# Patient Record
Sex: MALE | Race: WHITE | NOT HISPANIC OR LATINO | URBAN - METROPOLITAN AREA
[De-identification: names, ages, dates, MRNs, and addresses within clinical notes are randomized per-mention and may not be internally consistent; named-entity substitution may affect disease eponyms.]

---

## 2021-10-01 ENCOUNTER — INPATIENT HOSPITAL (OUTPATIENT)
Dept: URBAN - METROPOLITAN AREA HOSPITAL 124 | Facility: HOSPITAL | Age: 71
End: 2021-10-01

## 2021-10-01 DIAGNOSIS — D64.9 ANEMIA, UNSPECIFIED: ICD-10-CM

## 2021-10-01 DIAGNOSIS — R19.5 OTHER FECAL ABNORMALITIES: ICD-10-CM

## 2021-10-01 DIAGNOSIS — K92.1 MELENA: ICD-10-CM

## 2021-10-01 DIAGNOSIS — I48.91 UNSPECIFIED ATRIAL FIBRILLATION: ICD-10-CM

## 2021-10-01 PROCEDURE — 43235 EGD DIAGNOSTIC BRUSH WASH: CPT | Performed by: INTERNAL MEDICINE

## 2021-10-01 PROCEDURE — 99222 1ST HOSP IP/OBS MODERATE 55: CPT | Mod: 25 | Performed by: NURSE PRACTITIONER

## 2021-10-02 ENCOUNTER — INPATIENT HOSPITAL (OUTPATIENT)
Dept: URBAN - METROPOLITAN AREA HOSPITAL 124 | Facility: HOSPITAL | Age: 71
End: 2021-10-02

## 2021-10-02 DIAGNOSIS — R19.5 OTHER FECAL ABNORMALITIES: ICD-10-CM

## 2021-10-02 DIAGNOSIS — D64.9 ANEMIA, UNSPECIFIED: ICD-10-CM

## 2021-10-02 DIAGNOSIS — I48.91 UNSPECIFIED ATRIAL FIBRILLATION: ICD-10-CM

## 2021-10-02 PROCEDURE — 99232 SBSQ HOSP IP/OBS MODERATE 35: CPT | Performed by: INTERNAL MEDICINE

## 2021-10-04 ENCOUNTER — INPATIENT HOSPITAL (OUTPATIENT)
Dept: URBAN - METROPOLITAN AREA HOSPITAL 124 | Facility: HOSPITAL | Age: 71
End: 2021-10-04

## 2021-10-04 DIAGNOSIS — K92.1 MELENA: ICD-10-CM

## 2021-10-04 DIAGNOSIS — D12.3 BENIGN NEOPLASM OF TRANSVERSE COLON: ICD-10-CM

## 2021-10-04 PROCEDURE — 45385 COLONOSCOPY W/LESION REMOVAL: CPT | Performed by: INTERNAL MEDICINE

## 2022-04-15 ENCOUNTER — OFFICE (OUTPATIENT)
Dept: URBAN - METROPOLITAN AREA CLINIC 12 | Facility: CLINIC | Age: 72
End: 2022-04-15

## 2022-04-15 VITALS
WEIGHT: 229 LBS | DIASTOLIC BLOOD PRESSURE: 79 MMHG | SYSTOLIC BLOOD PRESSURE: 168 MMHG | HEART RATE: 113 BPM | OXYGEN SATURATION: 94 % | HEIGHT: 73 IN

## 2022-04-15 DIAGNOSIS — R19.5 OTHER FECAL ABNORMALITIES: ICD-10-CM

## 2022-04-15 DIAGNOSIS — D50.9 IRON DEFICIENCY ANEMIA, UNSPECIFIED: ICD-10-CM

## 2022-04-15 LAB
CBC, PLATELET, NO DIFFERENTIAL: HEMATOCRIT: 30.8 % — LOW (ref 37.5–51)
CBC, PLATELET, NO DIFFERENTIAL: HEMOGLOBIN: 8.9 G/DL — LOW (ref 13–17.7)
CBC, PLATELET, NO DIFFERENTIAL: MCH: 25.3 PG — LOW (ref 26.6–33)
CBC, PLATELET, NO DIFFERENTIAL: MCHC: 28.9 G/DL — LOW (ref 31.5–35.7)
CBC, PLATELET, NO DIFFERENTIAL: MCV: 88 FL (ref 79–97)
CBC, PLATELET, NO DIFFERENTIAL: PLATELETS: 359 X10E3/UL (ref 150–450)
CBC, PLATELET, NO DIFFERENTIAL: RBC: 3.52 X10E6/UL — LOW (ref 4.14–5.8)
CBC, PLATELET, NO DIFFERENTIAL: RDW: 21.7 % — HIGH (ref 11.6–15.4)
CBC, PLATELET, NO DIFFERENTIAL: WBC: 12.1 X10E3/UL — HIGH (ref 3.4–10.8)
COMP. METABOLIC PANEL (14): A/G RATIO: 1.5 (ref 1.2–2.2)
COMP. METABOLIC PANEL (14): ALBUMIN: 4.3 G/DL (ref 3.7–4.7)
COMP. METABOLIC PANEL (14): ALKALINE PHOSPHATASE: 82 IU/L (ref 44–121)
COMP. METABOLIC PANEL (14): ALT (SGPT): 18 IU/L (ref 0–44)
COMP. METABOLIC PANEL (14): AST (SGOT): 21 IU/L (ref 0–40)
COMP. METABOLIC PANEL (14): BILIRUBIN, TOTAL: 0.2 MG/DL (ref 0–1.2)
COMP. METABOLIC PANEL (14): BUN/CREATININE RATIO: 20 (ref 10–24)
COMP. METABOLIC PANEL (14): BUN: 27 MG/DL (ref 8–27)
COMP. METABOLIC PANEL (14): CALCIUM: 11.1 MG/DL — HIGH (ref 8.6–10.2)
COMP. METABOLIC PANEL (14): CARBON DIOXIDE, TOTAL: 21 MMOL/L (ref 20–29)
COMP. METABOLIC PANEL (14): CHLORIDE: 105 MMOL/L (ref 96–106)
COMP. METABOLIC PANEL (14): CREATININE: 1.32 MG/DL — HIGH (ref 0.76–1.27)
COMP. METABOLIC PANEL (14): EGFR: 58 ML/MIN/1.73 — LOW (ref 59–?)
COMP. METABOLIC PANEL (14): GLOBULIN, TOTAL: 2.8 G/DL (ref 1.5–4.5)
COMP. METABOLIC PANEL (14): GLUCOSE: 83 MG/DL (ref 65–99)
COMP. METABOLIC PANEL (14): POTASSIUM: 5.2 MMOL/L (ref 3.5–5.2)
COMP. METABOLIC PANEL (14): PROTEIN, TOTAL: 7.1 G/DL (ref 6–8.5)
COMP. METABOLIC PANEL (14): SODIUM: 139 MMOL/L (ref 134–144)
FERRITIN: 32 NG/ML (ref 30–400)
IRON AND TIBC: IRON BIND.CAP.(TIBC): 405 UG/DL (ref 250–450)
IRON AND TIBC: IRON SATURATION: 6 % — CRITICAL LOW (ref 15–55)
IRON AND TIBC: IRON: 24 UG/DL — LOW (ref 38–169)
IRON AND TIBC: UIBC: 381 UG/DL — HIGH (ref 111–343)

## 2022-04-15 PROCEDURE — 99214 OFFICE O/P EST MOD 30 MIN: CPT | Performed by: INTERNAL MEDICINE

## 2022-04-21 ENCOUNTER — OFFICE (OUTPATIENT)
Dept: URBAN - METROPOLITAN AREA CLINIC 11 | Facility: CLINIC | Age: 72
End: 2022-04-21
Payer: MEDICARE

## 2022-04-21 VITALS
DIASTOLIC BLOOD PRESSURE: 90 MMHG | SYSTOLIC BLOOD PRESSURE: 154 MMHG | HEART RATE: 44 BPM | DIASTOLIC BLOOD PRESSURE: 86 MMHG | HEIGHT: 73 IN | SYSTOLIC BLOOD PRESSURE: 174 MMHG | DIASTOLIC BLOOD PRESSURE: 69 MMHG | TEMPERATURE: 97.8 F | SYSTOLIC BLOOD PRESSURE: 142 MMHG | TEMPERATURE: 98 F | HEART RATE: 69 BPM | RESPIRATION RATE: 18 BRPM | HEART RATE: 63 BPM

## 2022-04-21 DIAGNOSIS — T45.4X5A ADVERSE EFFECT OF IRON AND ITS COMPOUNDS, INITIAL ENCOUNTER: ICD-10-CM

## 2022-04-21 DIAGNOSIS — D50.9 IRON DEFICIENCY ANEMIA, UNSPECIFIED: ICD-10-CM

## 2022-04-21 PROCEDURE — 96365 THER/PROPH/DIAG IV INF INIT: CPT | Performed by: INTERNAL MEDICINE

## 2022-04-21 NOTE — SERVICENOTES
Next Venofer infusion is 4/28 @ 0930.
Patient observed for 15 minutes post infusion. 
Patient denies chest pain, shortness of breath or dizziness.  
Venofer Handout given and reviewed with patient.
Patient was instructed on common side effects.
Patient verbalized a complete understanding and has no questions.

## 2022-04-26 ENCOUNTER — OFFICE (OUTPATIENT)
Dept: URBAN - METROPOLITAN AREA CLINIC 11 | Facility: CLINIC | Age: 72
End: 2022-04-26
Payer: MEDICARE

## 2022-04-26 VITALS
SYSTOLIC BLOOD PRESSURE: 185 MMHG | HEIGHT: 73 IN | TEMPERATURE: 97.8 F | SYSTOLIC BLOOD PRESSURE: 183 MMHG | DIASTOLIC BLOOD PRESSURE: 96 MMHG | RESPIRATION RATE: 18 BRPM | HEART RATE: 62 BPM | HEART RATE: 68 BPM | SYSTOLIC BLOOD PRESSURE: 164 MMHG | DIASTOLIC BLOOD PRESSURE: 88 MMHG

## 2022-04-26 DIAGNOSIS — D50.9 IRON DEFICIENCY ANEMIA, UNSPECIFIED: ICD-10-CM

## 2022-04-26 DIAGNOSIS — T45.4X5A ADVERSE EFFECT OF IRON AND ITS COMPOUNDS, INITIAL ENCOUNTER: ICD-10-CM

## 2022-04-26 PROCEDURE — 96365 THER/PROPH/DIAG IV INF INIT: CPT | Performed by: INTERNAL MEDICINE

## 2022-04-29 ENCOUNTER — OFFICE (OUTPATIENT)
Dept: URBAN - METROPOLITAN AREA CLINIC 11 | Facility: CLINIC | Age: 72
End: 2022-04-29
Payer: MEDICARE

## 2022-04-29 VITALS
DIASTOLIC BLOOD PRESSURE: 63 MMHG | HEIGHT: 73 IN | HEART RATE: 63 BPM | TEMPERATURE: 98 F | TEMPERATURE: 97.9 F | SYSTOLIC BLOOD PRESSURE: 120 MMHG | DIASTOLIC BLOOD PRESSURE: 60 MMHG | HEART RATE: 46 BPM | SYSTOLIC BLOOD PRESSURE: 122 MMHG | SYSTOLIC BLOOD PRESSURE: 143 MMHG | DIASTOLIC BLOOD PRESSURE: 73 MMHG | RESPIRATION RATE: 18 BRPM

## 2022-04-29 DIAGNOSIS — D50.9 IRON DEFICIENCY ANEMIA, UNSPECIFIED: ICD-10-CM

## 2022-04-29 DIAGNOSIS — T45.4X5A ADVERSE EFFECT OF IRON AND ITS COMPOUNDS, INITIAL ENCOUNTER: ICD-10-CM

## 2022-04-29 PROCEDURE — 96365 THER/PROPH/DIAG IV INF INIT: CPT | Performed by: INTERNAL MEDICINE

## 2022-05-02 ENCOUNTER — OFFICE (OUTPATIENT)
Dept: URBAN - METROPOLITAN AREA CLINIC 11 | Facility: CLINIC | Age: 72
End: 2022-05-02
Payer: MEDICARE

## 2022-05-02 VITALS
HEART RATE: 57 BPM | DIASTOLIC BLOOD PRESSURE: 75 MMHG | HEART RATE: 78 BPM | DIASTOLIC BLOOD PRESSURE: 79 MMHG | HEART RATE: 70 BPM | DIASTOLIC BLOOD PRESSURE: 70 MMHG | TEMPERATURE: 97.8 F | SYSTOLIC BLOOD PRESSURE: 142 MMHG | SYSTOLIC BLOOD PRESSURE: 134 MMHG | RESPIRATION RATE: 18 BRPM | SYSTOLIC BLOOD PRESSURE: 144 MMHG | HEIGHT: 73 IN

## 2022-05-02 DIAGNOSIS — T45.4X5A ADVERSE EFFECT OF IRON AND ITS COMPOUNDS, INITIAL ENCOUNTER: ICD-10-CM

## 2022-05-02 DIAGNOSIS — D50.9 IRON DEFICIENCY ANEMIA, UNSPECIFIED: ICD-10-CM

## 2022-05-02 PROCEDURE — 96365 THER/PROPH/DIAG IV INF INIT: CPT | Performed by: INTERNAL MEDICINE

## 2022-05-09 ENCOUNTER — OFFICE (OUTPATIENT)
Dept: URBAN - METROPOLITAN AREA CLINIC 11 | Facility: CLINIC | Age: 72
End: 2022-05-09
Payer: MEDICARE

## 2022-05-09 ENCOUNTER — OFFICE (OUTPATIENT)
Dept: URBAN - METROPOLITAN AREA CLINIC 11 | Facility: CLINIC | Age: 72
End: 2022-05-09

## 2022-05-09 VITALS
HEART RATE: 55 BPM | HEART RATE: 56 BPM | DIASTOLIC BLOOD PRESSURE: 59 MMHG | SYSTOLIC BLOOD PRESSURE: 104 MMHG | DIASTOLIC BLOOD PRESSURE: 58 MMHG | TEMPERATURE: 97.7 F | SYSTOLIC BLOOD PRESSURE: 108 MMHG | HEIGHT: 73 IN | RESPIRATION RATE: 18 BRPM | HEART RATE: 54 BPM | TEMPERATURE: 98.2 F | DIASTOLIC BLOOD PRESSURE: 53 MMHG | SYSTOLIC BLOOD PRESSURE: 131 MMHG

## 2022-05-09 DIAGNOSIS — D50.9 IRON DEFICIENCY ANEMIA, UNSPECIFIED: ICD-10-CM

## 2022-05-09 DIAGNOSIS — T45.4X5A ADVERSE EFFECT OF IRON AND ITS COMPOUNDS, INITIAL ENCOUNTER: ICD-10-CM

## 2022-05-09 PROCEDURE — 96365 THER/PROPH/DIAG IV INF INIT: CPT | Performed by: INTERNAL MEDICINE

## 2022-05-09 PROCEDURE — 91110 GI TRC IMG INTRAL ESOPH-ILE: CPT | Performed by: INTERNAL MEDICINE

## 2022-09-02 ENCOUNTER — OFFICE (OUTPATIENT)
Dept: URBAN - METROPOLITAN AREA CLINIC 12 | Facility: CLINIC | Age: 72
End: 2022-09-02

## 2022-09-02 VITALS
SYSTOLIC BLOOD PRESSURE: 134 MMHG | HEIGHT: 73 IN | WEIGHT: 224 LBS | DIASTOLIC BLOOD PRESSURE: 73 MMHG | OXYGEN SATURATION: 94 % | HEART RATE: 88 BPM

## 2022-09-02 DIAGNOSIS — D50.9 IRON DEFICIENCY ANEMIA, UNSPECIFIED: ICD-10-CM

## 2022-09-02 DIAGNOSIS — K55.20 ANGIODYSPLASIA OF COLON WITHOUT HEMORRHAGE: ICD-10-CM

## 2022-09-02 LAB
BASIC METABOLIC PANEL (8): BUN/CREATININE RATIO: 14 (ref 10–24)
BASIC METABOLIC PANEL (8): BUN: 19 MG/DL (ref 8–27)
BASIC METABOLIC PANEL (8): CALCIUM: 11.6 MG/DL — HIGH (ref 8.6–10.2)
BASIC METABOLIC PANEL (8): CARBON DIOXIDE, TOTAL: 24 MMOL/L (ref 20–29)
BASIC METABOLIC PANEL (8): CHLORIDE: 103 MMOL/L (ref 96–106)
BASIC METABOLIC PANEL (8): CREATININE: 1.35 MG/DL — HIGH (ref 0.76–1.27)
BASIC METABOLIC PANEL (8): EGFR: 56 ML/MIN/1.73 — LOW (ref 59–?)
BASIC METABOLIC PANEL (8): GLUCOSE: 101 MG/DL — HIGH (ref 65–99)
BASIC METABOLIC PANEL (8): POTASSIUM: 5.7 MMOL/L — HIGH (ref 3.5–5.2)
BASIC METABOLIC PANEL (8): SODIUM: 141 MMOL/L (ref 134–144)
CBC, PLATELET, NO DIFFERENTIAL: HEMATOCRIT: 37.4 % — LOW (ref 37.5–51)
CBC, PLATELET, NO DIFFERENTIAL: HEMOGLOBIN: 11.7 G/DL — LOW (ref 13–17.7)
CBC, PLATELET, NO DIFFERENTIAL: MCH: 29.7 PG (ref 26.6–33)
CBC, PLATELET, NO DIFFERENTIAL: MCHC: 31.3 G/DL — LOW (ref 31.5–35.7)
CBC, PLATELET, NO DIFFERENTIAL: MCV: 95 FL (ref 79–97)
CBC, PLATELET, NO DIFFERENTIAL: PLATELETS: 260 X10E3/UL (ref 150–450)
CBC, PLATELET, NO DIFFERENTIAL: RBC: 3.94 X10E6/UL — LOW (ref 4.14–5.8)
CBC, PLATELET, NO DIFFERENTIAL: RDW: 12.1 % (ref 11.6–15.4)
CBC, PLATELET, NO DIFFERENTIAL: WBC: 9.6 X10E3/UL (ref 3.4–10.8)
FE+TIBC+FER: FERRITIN: 21 NG/ML — LOW (ref 30–400)
FE+TIBC+FER: IRON BIND.CAP.(TIBC): 386 UG/DL (ref 250–450)
FE+TIBC+FER: IRON SATURATION: 12 % — LOW (ref 15–55)
FE+TIBC+FER: IRON: 47 UG/DL (ref 38–169)
FE+TIBC+FER: UIBC: 339 UG/DL (ref 111–343)

## 2022-09-02 PROCEDURE — 99214 OFFICE O/P EST MOD 30 MIN: CPT | Performed by: INTERNAL MEDICINE

## 2022-11-18 ENCOUNTER — ON CAMPUS - OUTPATIENT (OUTPATIENT)
Dept: URBAN - METROPOLITAN AREA HOSPITAL 125 | Facility: HOSPITAL | Age: 72
End: 2022-11-18
Payer: MEDICARE

## 2022-11-18 DIAGNOSIS — K92.2 GASTROINTESTINAL HEMORRHAGE, UNSPECIFIED: ICD-10-CM

## 2022-11-18 DIAGNOSIS — K44.9 DIAPHRAGMATIC HERNIA WITHOUT OBSTRUCTION OR GANGRENE: ICD-10-CM

## 2022-11-18 PROCEDURE — 44361 SMALL BOWEL ENDOSCOPY/BIOPSY: CPT | Performed by: INTERNAL MEDICINE

## 2023-04-07 ENCOUNTER — OFFICE (OUTPATIENT)
Dept: URBAN - METROPOLITAN AREA CLINIC 12 | Facility: CLINIC | Age: 73
End: 2023-04-07

## 2023-04-07 VITALS
OXYGEN SATURATION: 94 % | WEIGHT: 228 LBS | HEART RATE: 76 BPM | SYSTOLIC BLOOD PRESSURE: 123 MMHG | DIASTOLIC BLOOD PRESSURE: 62 MMHG | HEIGHT: 73 IN

## 2023-04-07 DIAGNOSIS — R11.0 NAUSEA: ICD-10-CM

## 2023-04-07 DIAGNOSIS — K55.20 ANGIODYSPLASIA OF COLON WITHOUT HEMORRHAGE: ICD-10-CM

## 2023-04-07 DIAGNOSIS — K21.9 GASTRO-ESOPHAGEAL REFLUX DISEASE WITHOUT ESOPHAGITIS: ICD-10-CM

## 2023-04-07 DIAGNOSIS — R14.2 ERUCTATION: ICD-10-CM

## 2023-04-07 DIAGNOSIS — D50.9 IRON DEFICIENCY ANEMIA, UNSPECIFIED: ICD-10-CM

## 2023-04-07 PROCEDURE — 99214 OFFICE O/P EST MOD 30 MIN: CPT | Performed by: INTERNAL MEDICINE

## 2023-04-07 RX ORDER — SUCRALFATE 1 G/1
2 TABLET ORAL
Qty: 60 | Refills: 1 | Status: ACTIVE
Start: 2023-04-07

## 2023-04-08 LAB
CBC, PLATELET, NO DIFFERENTIAL: HEMATOCRIT: 30.8 % — LOW (ref 37.5–51)
CBC, PLATELET, NO DIFFERENTIAL: HEMOGLOBIN: 9.9 G/DL — LOW (ref 13–17.7)
CBC, PLATELET, NO DIFFERENTIAL: MCH: 28.5 PG (ref 26.6–33)
CBC, PLATELET, NO DIFFERENTIAL: MCHC: 32.1 G/DL (ref 31.5–35.7)
CBC, PLATELET, NO DIFFERENTIAL: MCV: 89 FL (ref 79–97)
CBC, PLATELET, NO DIFFERENTIAL: PLATELETS: 283 X10E3/UL (ref 150–450)
CBC, PLATELET, NO DIFFERENTIAL: RBC: 3.47 X10E6/UL — LOW (ref 4.14–5.8)
CBC, PLATELET, NO DIFFERENTIAL: RDW: 12.4 % (ref 11.6–15.4)
CBC, PLATELET, NO DIFFERENTIAL: WBC: 9.6 X10E3/UL (ref 3.4–10.8)
FERRITIN: 10 NG/ML — LOW (ref 30–400)
IRON AND TIBC: IRON BIND.CAP.(TIBC): 399 UG/DL (ref 250–450)
IRON AND TIBC: IRON SATURATION: 6 % — CRITICAL LOW (ref 15–55)
IRON AND TIBC: IRON: 23 UG/DL — LOW (ref 38–169)
IRON AND TIBC: UIBC: 376 UG/DL — HIGH (ref 111–343)

## 2023-08-18 ENCOUNTER — OFFICE (OUTPATIENT)
Dept: URBAN - METROPOLITAN AREA CLINIC 12 | Facility: CLINIC | Age: 73
End: 2023-08-18

## 2023-08-18 VITALS
WEIGHT: 219 LBS | HEART RATE: 80 BPM | OXYGEN SATURATION: 96 % | HEIGHT: 73 IN | SYSTOLIC BLOOD PRESSURE: 108 MMHG | DIASTOLIC BLOOD PRESSURE: 60 MMHG

## 2023-08-18 DIAGNOSIS — R11.0 NAUSEA: ICD-10-CM

## 2023-08-18 DIAGNOSIS — K21.9 GASTRO-ESOPHAGEAL REFLUX DISEASE WITHOUT ESOPHAGITIS: ICD-10-CM

## 2023-08-18 DIAGNOSIS — D50.9 IRON DEFICIENCY ANEMIA, UNSPECIFIED: ICD-10-CM

## 2023-08-18 DIAGNOSIS — R14.2 ERUCTATION: ICD-10-CM

## 2023-08-18 PROCEDURE — 99214 OFFICE O/P EST MOD 30 MIN: CPT | Performed by: INTERNAL MEDICINE

## 2023-08-19 LAB
CBC, PLATELET, NO DIFFERENTIAL: HEMATOCRIT: 30.6 % — LOW (ref 37.5–51)
CBC, PLATELET, NO DIFFERENTIAL: HEMOGLOBIN: 9.5 G/DL — LOW (ref 13–17.7)
CBC, PLATELET, NO DIFFERENTIAL: MCH: 28.4 PG (ref 26.6–33)
CBC, PLATELET, NO DIFFERENTIAL: MCHC: 31 G/DL — LOW (ref 31.5–35.7)
CBC, PLATELET, NO DIFFERENTIAL: MCV: 92 FL (ref 79–97)
CBC, PLATELET, NO DIFFERENTIAL: PLATELETS: 293 X10E3/UL (ref 150–450)
CBC, PLATELET, NO DIFFERENTIAL: RBC: 3.34 X10E6/UL — LOW (ref 4.14–5.8)
CBC, PLATELET, NO DIFFERENTIAL: RDW: 13.2 % (ref 11.6–15.4)
CBC, PLATELET, NO DIFFERENTIAL: WBC: 11.9 X10E3/UL — HIGH (ref 3.4–10.8)
COMP. METABOLIC PANEL (14): A/G RATIO: 1.5 (ref 1.2–2.2)
COMP. METABOLIC PANEL (14): ALBUMIN: 3.9 G/DL (ref 3.8–4.8)
COMP. METABOLIC PANEL (14): ALKALINE PHOSPHATASE: 98 IU/L (ref 44–121)
COMP. METABOLIC PANEL (14): ALT (SGPT): 9 IU/L (ref 0–44)
COMP. METABOLIC PANEL (14): AST (SGOT): 15 IU/L (ref 0–40)
COMP. METABOLIC PANEL (14): BILIRUBIN, TOTAL: <0.2 MG/DL
COMP. METABOLIC PANEL (14): BUN/CREATININE RATIO: 14 (ref 10–24)
COMP. METABOLIC PANEL (14): BUN: 17 MG/DL (ref 8–27)
COMP. METABOLIC PANEL (14): CALCIUM: 10.3 MG/DL — HIGH (ref 8.6–10.2)
COMP. METABOLIC PANEL (14): CARBON DIOXIDE, TOTAL: 24 MMOL/L (ref 20–29)
COMP. METABOLIC PANEL (14): CHLORIDE: 105 MMOL/L (ref 96–106)
COMP. METABOLIC PANEL (14): CREATININE: 1.24 MG/DL (ref 0.76–1.27)
COMP. METABOLIC PANEL (14): EGFR: 62 ML/MIN/1.73 (ref 59–?)
COMP. METABOLIC PANEL (14): GLOBULIN, TOTAL: 2.6 G/DL (ref 1.5–4.5)
COMP. METABOLIC PANEL (14): GLUCOSE: 97 MG/DL (ref 70–99)
COMP. METABOLIC PANEL (14): POTASSIUM: 4.6 MMOL/L (ref 3.5–5.2)
COMP. METABOLIC PANEL (14): PROTEIN, TOTAL: 6.5 G/DL (ref 6–8.5)
COMP. METABOLIC PANEL (14): SODIUM: 139 MMOL/L (ref 134–144)
FE+TIBC+FER: FERRITIN: 12 NG/ML — LOW (ref 30–400)
FE+TIBC+FER: IRON BIND.CAP.(TIBC): 368 UG/DL (ref 250–450)
FE+TIBC+FER: IRON SATURATION: 4 % — CRITICAL LOW (ref 15–55)
FE+TIBC+FER: IRON: 15 UG/DL — LOW (ref 38–169)
FE+TIBC+FER: UIBC: 353 UG/DL — HIGH (ref 111–343)

## 2024-02-13 ENCOUNTER — OFFICE (OUTPATIENT)
Dept: URBAN - METROPOLITAN AREA CLINIC 11 | Facility: CLINIC | Age: 74
End: 2024-02-13

## 2024-02-13 VITALS
HEIGHT: 73 IN | HEART RATE: 79 BPM | DIASTOLIC BLOOD PRESSURE: 78 MMHG | OXYGEN SATURATION: 97 % | SYSTOLIC BLOOD PRESSURE: 104 MMHG | WEIGHT: 210 LBS

## 2024-02-13 DIAGNOSIS — K21.9 GASTRO-ESOPHAGEAL REFLUX DISEASE WITHOUT ESOPHAGITIS: ICD-10-CM

## 2024-02-13 DIAGNOSIS — D50.9 IRON DEFICIENCY ANEMIA, UNSPECIFIED: ICD-10-CM

## 2024-02-13 PROCEDURE — 99214 OFFICE O/P EST MOD 30 MIN: CPT | Performed by: NURSE PRACTITIONER

## 2024-02-13 RX ORDER — PANTOPRAZOLE 40 MG/1
TABLET, DELAYED RELEASE ORAL
Qty: 30 | Refills: 4 | Status: ACTIVE

## 2024-02-13 RX ORDER — SUCRALFATE 1 G/1
2 TABLET ORAL
Qty: 60 | Refills: 1 | Status: ACTIVE
Start: 2023-04-07

## 2024-02-13 NOTE — SERVICENOTES
will check labs and he will continue his oral iron.   He continues to have belching and some reflux.  Will check gastric emptying study as this was not previously done.  He will continue pantoprazole and restart sucralfate.  Will see back in 4 months or sooner if needed

## 2024-02-13 NOTE — SERVICEHPINOTES
Mr. Rowe is a 72yo M that presents for follow-up of his iron deficiency anemia.   He was having issues with peripheral neuropathy at his last visit and a gastric emptying study was ordered.  However, he was diagnosed with AFib and did not have the test done.  In follow-up today,    he is doing fairly well.  He had an ablation by Dr. Alcocer  for his AFib.   He continues taking oral iron daily and his PCP at [facility].   He continues on pantoprazole daily but has some belching, bloating and occasional reflux.   He is taking sucralfate in the  past and this has helped his symptoms.  He denies any nausea, vomiting, abdominal pain br
brAn outline of his history can be seen below.  after his initial visit we did plan for IV iron given that he had not responded very well to oral iron.  We performed a capsule endoscopy which was a difficult study as the capsule seemingly got hung in the duodenal bulb area for a prolonged amount of time before passing back into the stomach and then back into the duodenum.  There was a vascular type lesion in the small bowel but the exact location is difficult to know due to the timing issues.  In follow-up he was doing very well.  We did perform a single balloon enteroscopy in November 2022 but a vascular lesion was not identified either indicating the previously seen lesion had healed or we were unable to reach far enough in the small bowel.  At last visit he was doing well overall but noted that his cardiologist started him on Eliquis due to his atrial fibrillation.  at that time he denied any overt bleeding but we discussed that given his history he needed to monitor for signs of blood loss very closely and he was watching his stools.  We checked his labs and he did have some persistent mild anemia but his iron studies were normal which is reassuring that he was not having significant blood loss as he likely has some component of anemia of chronic disease as well.  In follow-up today he reports that he continues to have issues with peripheral neuropathy.  He also reports that he is having more frequent episodes of atrial fibrillation which he can tell.  He says his cardiologist is aware of this.  When he arrived today he said he felt like he was having 1 of these episodes and on initial check of his vitals his heart rate was reported to be 113. however shortly thereafter when I repeated this he said it felt like his heart rate issues had resolved and his heart rate on manual recheck was 80. he notes that he has continued to have dark stools while taking oral iron.  He stop taking his oral iron for a week earlier this month to ensure that his stools returned to normal and he said that his stools did returned to brown and were no longer dark.  He has subsequently started his iron back. he does report continued belching and bloating  I initially saw him in clinic in April 2022 when he presented to establish care in clinic as a hospital follow-up.  He was seen previously as a hospital consult at [facility] in October 2021 when he presented with anemia and dark stools and was noted to have atrial fibrillation with RVR.  GI was consulted for that time and I saw him initially.  His hemoglobin was 7.7.  I performed an upper endoscopy which was normal.  He then subsequent underwent a colonoscopy a couple of days later by Dr. Fajardo which was a complete study with an adequate prep.  A 1.5 cm polyp was identified in the transverse colon which was pedunculated and removed via hot snare.  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  His hemoglobin and hematocrit remained stable and he was subsequently discharged.  He recently followed up this PCP and on routine lab work was found to have hemoglobin of 8 and hematocrit of 27. he had a mild elevation in his creatinine but this actually fell within the upper limit of normal.  However, based on his age, his creatinine clearance was slightly decreased.  He was referred to clinic for further evaluation given the ongoing anemia.  He says that before all this started with the dark stools he had been taking baby aspirin for about a year.  He has subsequently stopped taking this. He is following up with Cardiology regarding his atrial fibrillation issues.  He is not taking any blood thinners.  He says since starting the iron his stool has turned dark once again but he denies any andrey melena and has not seen any red blood.   His PCP prescribed iron in the patient says he has been on that for approximately 7 days. Overall he says he feels well.  He does note that his PCP recently check some stool test and they called have a few days ago to let him know that his fecal occult blood test was positive.

## 2024-02-14 LAB
CBC, PLATELET, NO DIFFERENTIAL: HEMATOCRIT: 37.7 % (ref 37.5–51)
CBC, PLATELET, NO DIFFERENTIAL: HEMOGLOBIN: 11.5 G/DL — LOW (ref 13–17.7)
CBC, PLATELET, NO DIFFERENTIAL: MCH: 28.5 PG (ref 26.6–33)
CBC, PLATELET, NO DIFFERENTIAL: MCHC: 30.5 G/DL — LOW (ref 31.5–35.7)
CBC, PLATELET, NO DIFFERENTIAL: MCV: 94 FL (ref 79–97)
CBC, PLATELET, NO DIFFERENTIAL: PLATELETS: 360 X10E3/UL (ref 150–450)
CBC, PLATELET, NO DIFFERENTIAL: RBC: 4.03 X10E6/UL — LOW (ref 4.14–5.8)
CBC, PLATELET, NO DIFFERENTIAL: RDW: 12.5 % (ref 11.6–15.4)
CBC, PLATELET, NO DIFFERENTIAL: WBC: 9.7 X10E3/UL (ref 3.4–10.8)
FE+TIBC+FER: FERRITIN: 51 NG/ML (ref 30–400)
FE+TIBC+FER: IRON BIND.CAP.(TIBC): 318 UG/DL (ref 250–450)
FE+TIBC+FER: IRON SATURATION: 8 % — CRITICAL LOW (ref 15–55)
FE+TIBC+FER: IRON: 25 UG/DL — LOW (ref 38–169)
FE+TIBC+FER: UIBC: 293 UG/DL (ref 111–343)

## 2025-02-03 ENCOUNTER — OFFICE (OUTPATIENT)
Dept: URBAN - METROPOLITAN AREA CLINIC 11 | Facility: CLINIC | Age: 75
End: 2025-02-03
Payer: MEDICARE

## 2025-02-03 VITALS
DIASTOLIC BLOOD PRESSURE: 63 MMHG | WEIGHT: 169 LBS | HEART RATE: 90 BPM | SYSTOLIC BLOOD PRESSURE: 117 MMHG | HEIGHT: 73 IN | OXYGEN SATURATION: 92 %

## 2025-02-03 DIAGNOSIS — D50.9 IRON DEFICIENCY ANEMIA, UNSPECIFIED: ICD-10-CM

## 2025-02-03 DIAGNOSIS — K63.89 OTHER SPECIFIED DISEASES OF INTESTINE: ICD-10-CM

## 2025-02-03 DIAGNOSIS — R10.84 GENERALIZED ABDOMINAL PAIN: ICD-10-CM

## 2025-02-03 DIAGNOSIS — R93.3 ABNORMAL FINDINGS ON DIAGNOSTIC IMAGING OF OTHER PARTS OF DI: ICD-10-CM

## 2025-02-03 DIAGNOSIS — K21.9 GASTRO-ESOPHAGEAL REFLUX DISEASE WITHOUT ESOPHAGITIS: ICD-10-CM

## 2025-02-03 PROCEDURE — 99214 OFFICE O/P EST MOD 30 MIN: CPT | Performed by: NURSE PRACTITIONER

## 2025-02-03 RX ORDER — SODIUM PICOSULFATE, MAGNESIUM OXIDE, AND ANHYDROUS CITRIC ACID 12; 3.5; 1 G/175ML; G/175ML; MG/175ML
LIQUID ORAL
Qty: 1 | Refills: 0 | Status: ACTIVE
Start: 2025-02-03

## 2025-02-03 NOTE — SERVICEHPINOTES
Mr. Rowe is a 73yo M that presents for follow-up of his iron deficiency anemia.  He was initially seen in clinic in April 2022 when he presented to establish care in clinic as a hospital follow-up.  He was seen previously as a hospital consult at [facility] in October 2021 when he presented with anemia and dark stools and was noted to have atrial fibrillation with RVR.  GI was consulted for that time and I saw him initially.  His hemoglobin was 7.7.  I performed an upper endoscopy which was normal.  He then subsequent underwent a colonoscopy a couple of days later by Dr. Fajardo which was a complete study with an adequate prep.  A 1.5 cm polyp was identified in the transverse colon which was pedunculated and removed via hot snare.  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  His hemoglobin and hematocrit remained stable and he was subsequently discharged.  He recently followed up this PCP and on routine lab work was found to have hemoglobin of 8 and hematocrit of 27. he had a mild elevation in his creatinine but this actually fell within the upper limit of normal.  However, based on his age, his creatinine clearance was slightly decreased.  He was referred to clinic for further evaluation given the ongoing anemia.  He says that before all this started with the dark stools he had been taking baby aspirin for about a year.  He has subsequently stopped taking this. He is following up with Cardiology regarding his atrial fibrillation issues.  He is not taking any blood thinners.  He says since starting the iron his stool has turned dark once again but he denies any andrey melena and has not seen any red blood.   His PCP prescribed iron in the patient says he has been on that for approximately 7 days. Overall he says he feels well.  He does note that his PCP recently check some stool test and they called have a few days ago to let him know that his fecal occult blood test was positive.   after his initial visit, we did plan for IV iron given that he had not responded very well to oral iron.  We performed a capsule endoscopy which was a difficult study as the capsule seemingly got hung in the duodenal bulb area for a prolonged amount of time before passing back into the stomach and then back into the duodenum.  There was a vascular type lesion in the small bowel but the exact location is difficult to know due to the timing issues.  In follow-up he was doing very well.  We did perform a single balloon enteroscopy in November 2022 but a vascular lesion was not identified either indicating the previously seen lesion had healed or we were unable to reach far enough in the small bowel.  At last visit he was doing well overall but noted that his cardiologist started him on Eliquis due to his atrial fibrillation.  at that time he denied any overt bleeding but we discussed that given his history he needed to monitor for signs of blood loss very closely and he was watching his stools.  We checked his labs and he did have some persistent mild anemia but his iron studies were normal which is reassuring that he was not having significant blood loss as he likely has some component of anemia of chronic disease as well.  In follow-up today he reports that he continues to have issues with peripheral neuropathy.  He also reports that he is having more frequent episodes of atrial fibrillation which he can tell.  He says his cardiologist is aware of this.  When he arrived today he said he felt like he was having 1 of these episodes and on initial check of his vitals his heart rate was reported to be 113. however shortly thereafter when I repeated this he said it felt like his heart rate issues had resolved and his heart rate on manual recheck was 80. he notes that he has continued to have dark stools while taking oral iron.  He stop taking his oral iron for a week earlier this month to ensure that his stools returned to normal and he said that his stools did returned to brown and were no longer dark.  He has subsequently started his iron back. he does report continued belching and bloating.   In follow up on 2/13/24,  He was having issues with peripheral neuropathy at his last visit and a gastric emptying study was ordered.  However, he was diagnosed with AFib and did not have the test done.  In follow-up today,    he is doing fairly well.  He had an ablation by Dr. Alcocer  for his AFib.   He continues taking oral iron daily and his PCP at [facility].   He continues on pantoprazole daily but has some belching, bloating and occasional reflux.   He is taking sucralfate in the  past and this has helped his symptoms.  He denies any nausea, vomiting, abdominal pain.  He had a normal gastric emptying study on 02/20/2024. In follow up on 9/16/24, he was seen by Dr. Cruz at [facility] on 07/31 24 for his iron-deficiency anemia. He was set up for IV iron.   He recently had a positive occult stool test at his PCP.   He has dark stools but denies any hematochezia or melena.  He continues on Eliquis for his atrial fibrillation is followed by Dr. Andrea Mathews.  His main complaint today is that he has sleep apnea and has not slept in 6 months.   He continues on pantoprazole daily.  He denies any nausea, vomiting, abdominal pain. 
br
br   In follow-up on 02/03/2025,  he saw Dr. Cruz recently and had a CT CAP on 1/8/25 showed asymmetric wall thickening distal sigmoid colon, proximal sigmoid colon with wall thickening assist vision of perforation and Spiriva aspect of proximal sigmoid colon, increase in size of subpleural nodule the RLL, multiple liver mets likely.  Patient's daughter is with him at today's visit.    He has 3 children all live out of town.   He continues on pantoprazole for GERD in his symptoms are pretty well controlled.   He has abdominal pain secondary to an abdominal hernia  but denies any right upper quadrant pain.    He denies any nausea, vomiting, reflux.   He denies any change in bowel habits or blood in stool.

## 2025-02-03 NOTE — SERVICENOTES
I ordered a colonoscopy when I saw him on 09/16/2024.  We received cardiac clearance on 09/20/2024.   He did not want any invasive testing when I saw him at that visit.   Cardiac clearance good through March 16th.  Will get him scheduled for colonoscopy

## 2025-03-06 ENCOUNTER — OFFICE (OUTPATIENT)
Dept: URBAN - METROPOLITAN AREA PATHOLOGY 12 | Facility: PATHOLOGY | Age: 75
End: 2025-03-06
Payer: MEDICARE

## 2025-03-06 ENCOUNTER — AMBULATORY SURGICAL CENTER (OUTPATIENT)
Dept: URBAN - METROPOLITAN AREA SURGERY 3 | Facility: SURGERY | Age: 75
End: 2025-03-06
Payer: MEDICARE

## 2025-03-06 VITALS
HEART RATE: 77 BPM | WEIGHT: 159 LBS | HEART RATE: 83 BPM | DIASTOLIC BLOOD PRESSURE: 79 MMHG | HEIGHT: 73 IN | OXYGEN SATURATION: 97 % | WEIGHT: 159 LBS | DIASTOLIC BLOOD PRESSURE: 80 MMHG | RESPIRATION RATE: 16 BRPM | TEMPERATURE: 98.1 F | DIASTOLIC BLOOD PRESSURE: 70 MMHG | DIASTOLIC BLOOD PRESSURE: 80 MMHG | DIASTOLIC BLOOD PRESSURE: 60 MMHG | RESPIRATION RATE: 26 BRPM | SYSTOLIC BLOOD PRESSURE: 129 MMHG | SYSTOLIC BLOOD PRESSURE: 127 MMHG | OXYGEN SATURATION: 99 % | DIASTOLIC BLOOD PRESSURE: 78 MMHG | HEART RATE: 79 BPM | SYSTOLIC BLOOD PRESSURE: 127 MMHG | OXYGEN SATURATION: 97 % | RESPIRATION RATE: 16 BRPM | HEIGHT: 73 IN | DIASTOLIC BLOOD PRESSURE: 79 MMHG | HEART RATE: 68 BPM | OXYGEN SATURATION: 91 % | SYSTOLIC BLOOD PRESSURE: 92 MMHG | DIASTOLIC BLOOD PRESSURE: 60 MMHG | SYSTOLIC BLOOD PRESSURE: 108 MMHG | HEART RATE: 77 BPM | HEART RATE: 83 BPM | SYSTOLIC BLOOD PRESSURE: 108 MMHG | HEART RATE: 68 BPM | RESPIRATION RATE: 22 BRPM | DIASTOLIC BLOOD PRESSURE: 70 MMHG | OXYGEN SATURATION: 91 % | SYSTOLIC BLOOD PRESSURE: 129 MMHG | SYSTOLIC BLOOD PRESSURE: 119 MMHG | DIASTOLIC BLOOD PRESSURE: 78 MMHG | OXYGEN SATURATION: 99 % | TEMPERATURE: 98.2 F | HEART RATE: 79 BPM | RESPIRATION RATE: 22 BRPM | SYSTOLIC BLOOD PRESSURE: 92 MMHG | TEMPERATURE: 98.2 F | RESPIRATION RATE: 17 BRPM | TEMPERATURE: 98.1 F | RESPIRATION RATE: 17 BRPM | RESPIRATION RATE: 26 BRPM | SYSTOLIC BLOOD PRESSURE: 119 MMHG

## 2025-03-06 DIAGNOSIS — K56.600 PARTIAL INTESTINAL OBSTRUCTION, UNSPECIFIED AS TO CAUSE: ICD-10-CM

## 2025-03-06 DIAGNOSIS — D50.9 IRON DEFICIENCY ANEMIA, UNSPECIFIED: ICD-10-CM

## 2025-03-06 DIAGNOSIS — C18.7 MALIGNANT NEOPLASM OF SIGMOID COLON: ICD-10-CM

## 2025-03-06 DIAGNOSIS — D37.4 NEOPLASM OF UNCERTAIN BEHAVIOR OF COLON: ICD-10-CM

## 2025-03-06 DIAGNOSIS — R93.3 ABNORMAL FINDINGS ON DIAGNOSTIC IMAGING OF OTHER PARTS OF DI: ICD-10-CM

## 2025-03-06 PROCEDURE — 88341 IMHCHEM/IMCYTCHM EA ADD ANTB: CPT | Performed by: PATHOLOGY

## 2025-03-06 PROCEDURE — 88342 IMHCHEM/IMCYTCHM 1ST ANTB: CPT | Performed by: PATHOLOGY

## 2025-03-06 PROCEDURE — 88305 TISSUE EXAM BY PATHOLOGIST: CPT | Performed by: PATHOLOGY

## 2025-03-06 PROCEDURE — 45335 SIGMOIDOSCOPY W/SUBMUC INJ: CPT | Performed by: INTERNAL MEDICINE

## 2025-03-06 PROCEDURE — 45331 SIGMOIDOSCOPY AND BIOPSY: CPT | Mod: 59 | Performed by: INTERNAL MEDICINE

## 2025-03-06 NOTE — SERVICEHPINOTES
Mr. Rowe is a 73yo M that presents for colonoscopy. He was recently seen for follow-up of his iron deficiency anemia. On 02/03/2025,  he saw Dr. Cruz recently and had a CT CAP on 1/8/25 showed asymmetric wall thickening distal sigmoid colon, proximal sigmoid colon with wall thickening assist vision of perforation and Spiriva aspect of proximal sigmoid colon, increase in size of subpleural nodule the RLL, multiple liver mets likely. His last colonoscopy was in 2021 and was performed while he was in the hospital while hospitalized. At that time it was reported that adequate views were obtained and he had a transverse colon polyp removed.    Problem: Knowledge Deficit - Standard  Goal: Patient and family/care givers will demonstrate understanding of plan of care, disease process/condition, diagnostic tests and medications  Outcome: Progressing     Problem: Fall Risk  Goal: Patient will remain free from falls  Outcome: Progressing     Problem: Pain - Standard  Goal: Alleviation of pain or a reduction in pain to the patient’s comfort goal  Outcome: Progressing     The patient is Stable - Low risk of patient condition declining or worsening    Shift Goals  Clinical Goals: Safety, BP control, discharge  Patient Goals: discharge  Family Goals: n/a    Progress made toward(s) clinical / shift goals:  Scheduled & PRN meds administered to control BP. Assistance provided to pt during ambulation. All needs met prior to DC.    Patient is not progressing towards the following goals:

## 2025-03-06 NOTE — SERVICENOTES
Additional Notes:
pt has large umbilical hernia. samantha lower extremity swelling pt states is due to neuropathy

Intra-Procedure Room:
 Procedure Room 6;
Anesthesia Equipment Checked

PACU Handoff:
Patient's name, arrival to PACU time, allergies and safety concerns identified
The procedure performed and proceduralist were communicated
Pertinent health and medication history was communicated. All airway management concerns and other information sharing was provided.
The type of anesthesia, EKG data, vital signs and monitoring trends were communicated
Confirmation with the PACU nurse all questions or concerns have been addressed


Post-Procedure Room:
 12

Pre-Procedure Notes:
Attestation: I, Jake Foss, hereby attest that the medical record entry for this procedure accurately reflects signatures/notations that I made in my capacity as Gastroenterologist when I assessed this patient on the procedure date. I do hereby attest that the information is true, accurate and complete to the best of my knowledge.


Pre-Procedure Room:
 12

## 2025-03-06 NOTE — SERVICEHPINOTES
Mr. Rowe is a 73yo M that presents for colonoscopy. He was recently seen for follow-up of his iron deficiency anemia. On 02/03/2025,  he saw Dr. Cruz recently and had a CT CAP on 1/8/25 showed asymmetric wall thickening distal sigmoid colon, proximal sigmoid colon with wall thickening assist vision of perforation and Spiriva aspect of proximal sigmoid colon, increase in size of subpleural nodule the RLL, multiple liver mets likely. His last colonoscopy was in 2021 and was performed while he was in the hospital while hospitalized. At that time it was reported that adequate views were obtained and he had a transverse colon polyp removed.

## 2025-03-11 LAB
GASTRO ONE PATHOLOGY: PDF REPORT: (no result)
GASTRO ONE PATHOLOGY: PDF REPORT: (no result)